# Patient Record
Sex: MALE | Race: ASIAN | NOT HISPANIC OR LATINO | URBAN - METROPOLITAN AREA
[De-identification: names, ages, dates, MRNs, and addresses within clinical notes are randomized per-mention and may not be internally consistent; named-entity substitution may affect disease eponyms.]

---

## 2017-11-02 ASSESSMENT — MIFFLIN-ST. JEOR: SCORE: 1149.01

## 2017-11-03 ENCOUNTER — SURGERY - HEALTHEAST (OUTPATIENT)
Dept: GASTROENTEROLOGY | Facility: HOSPITAL | Age: 77
End: 2017-11-03

## 2017-11-04 ASSESSMENT — MIFFLIN-ST. JEOR: SCORE: 1124.97

## 2017-12-27 ENCOUNTER — TELEPHONE (OUTPATIENT)
Dept: FAMILY MEDICINE | Facility: CLINIC | Age: 77
End: 2017-12-27

## 2017-12-27 NOTE — TELEPHONE ENCOUNTER
Called son. Concerned about bill from hospital and insurance coverage. Stated to call the billing department at the hospital to help work this out or make insurance company appeal.    Stated I can help with documentation needs if something comes up and to call back as needed.    Thee Salinas MD  [facility] Medicine Resident  Pager# [phone]

## 2017-12-27 NOTE — TELEPHONE ENCOUNTER
[facility] phone call message- general phone call:    Reason for call: Pt son is calling concerned in regards to the pt, pt was seen at [facility], isn't a pt here however would like to speak to Dr. Marcos.    Return call needed: Yes    OK to leave a message on voice mail? Yes    Primary language: Not on file      needed? Not on file    Call taken on December 27, 2017 at 10:22 AM by Ne Caal

## 2021-05-31 VITALS — HEIGHT: 63 IN | WEIGHT: 112.4 LBS | BODY MASS INDEX: 19.91 KG/M2

## 2021-06-16 PROBLEM — I10 HYPERTENSION: Status: ACTIVE | Noted: 2017-11-02

## 2021-06-16 PROBLEM — E11.9 T2DM (TYPE 2 DIABETES MELLITUS) (H): Status: ACTIVE | Noted: 2017-11-02

## 2021-06-16 PROBLEM — K92.2 UPPER GI BLEEDING: Status: ACTIVE | Noted: 2017-11-02

## 2021-06-16 PROBLEM — K92.1 MELENA: Status: ACTIVE | Noted: 2017-11-02
